# Patient Record
Sex: FEMALE | Race: WHITE
[De-identification: names, ages, dates, MRNs, and addresses within clinical notes are randomized per-mention and may not be internally consistent; named-entity substitution may affect disease eponyms.]

---

## 2017-05-19 ENCOUNTER — HOSPITAL ENCOUNTER (EMERGENCY)
Dept: HOSPITAL 1 - ED | Age: 57
LOS: 1 days | Discharge: HOME | End: 2017-05-20
Payer: MEDICAID

## 2017-05-19 VITALS — HEIGHT: 64 IN | WEIGHT: 173.99 LBS | BODY MASS INDEX: 29.7 KG/M2

## 2017-05-19 DIAGNOSIS — E11.9: ICD-10-CM

## 2017-05-19 DIAGNOSIS — J32.2: ICD-10-CM

## 2017-05-19 DIAGNOSIS — Z88.6: ICD-10-CM

## 2017-05-19 DIAGNOSIS — J32.0: Primary | ICD-10-CM

## 2017-05-19 DIAGNOSIS — J32.3: ICD-10-CM

## 2017-05-19 DIAGNOSIS — Z95.5: ICD-10-CM

## 2017-05-19 DIAGNOSIS — I21.3: ICD-10-CM

## 2017-05-19 DIAGNOSIS — Z79.899: ICD-10-CM

## 2017-05-19 DIAGNOSIS — E78.00: ICD-10-CM

## 2017-05-19 DIAGNOSIS — I10: ICD-10-CM

## 2017-05-19 DIAGNOSIS — Z88.0: ICD-10-CM

## 2017-05-19 DIAGNOSIS — M19.90: ICD-10-CM

## 2017-05-20 VITALS — DIASTOLIC BLOOD PRESSURE: 78 MMHG | SYSTOLIC BLOOD PRESSURE: 136 MMHG

## 2017-05-20 LAB
ALBUMIN SERPL-MCNC: 3.5 G/DL (ref 3.4–5)
ALP SERPL-CCNC: 108 U/L (ref 46–116)
ALT SERPL-CCNC: 79 U/L (ref 14–59)
AST SERPL-CCNC: 47 U/L (ref 15–37)
BASOPHILS NFR BLD: 0.3 % (ref 0–2)
BILIRUB SERPL-MCNC: 0.4 MG/DL (ref 0.2–1)
BUN SERPL-MCNC: 16 MG/DL (ref 7–18)
CALCIUM SERPL-MCNC: 8.7 MG/DL (ref 8.5–10.1)
CHLORIDE SERPL-SCNC: 99 MMOL/L (ref 98–107)
CO2 SERPL-SCNC: 26.8 MMOL/L (ref 21–32)
CREAT SERPL-MCNC: 1 MG/DL (ref 0.6–1)
ERYTHROCYTE [DISTWIDTH] IN BLOOD BY AUTOMATED COUNT: 15.3 % (ref 11.5–14.5)
GFR SERPLBLD BASED ON 1.73 SQ M-ARVRAT: > 60 ML/MIN
GLUCOSE SERPL-MCNC: 237 MG/DL (ref 74–106)
MICROSCOPIC UR-IMP: YES
PLATELET # BLD: 235 X10^3MCL (ref 130–400)
POTASSIUM SERPL-SCNC: 3.8 MMOL/L (ref 3.5–5.1)
PROT SERPL-MCNC: 7.3 G/DL (ref 6.4–8.2)
RBC # UR STRIP.AUTO: NEGATIVE /UL
SODIUM SERPL-SCNC: 133 MMOL/L (ref 136–145)
UA SPECIFIC GRAVITY: 1.01 (ref 1–1.03)

## 2018-08-05 ENCOUNTER — HOSPITAL ENCOUNTER (INPATIENT)
Dept: HOSPITAL 1 - ED | Age: 58
LOS: 1 days | Discharge: HOME | DRG: 243 | End: 2018-08-06
Attending: INTERNAL MEDICINE | Admitting: INTERNAL MEDICINE
Payer: MEDICAID

## 2018-08-05 VITALS — SYSTOLIC BLOOD PRESSURE: 149 MMHG | DIASTOLIC BLOOD PRESSURE: 71 MMHG

## 2018-08-05 VITALS
WEIGHT: 168.25 LBS | BODY MASS INDEX: 29.81 KG/M2 | WEIGHT: 168.25 LBS | HEIGHT: 63 IN | BODY MASS INDEX: 29.81 KG/M2 | HEIGHT: 63 IN

## 2018-08-05 VITALS — DIASTOLIC BLOOD PRESSURE: 71 MMHG | SYSTOLIC BLOOD PRESSURE: 155 MMHG

## 2018-08-05 VITALS — SYSTOLIC BLOOD PRESSURE: 147 MMHG | DIASTOLIC BLOOD PRESSURE: 66 MMHG

## 2018-08-05 VITALS — DIASTOLIC BLOOD PRESSURE: 71 MMHG | SYSTOLIC BLOOD PRESSURE: 158 MMHG

## 2018-08-05 DIAGNOSIS — I11.9: ICD-10-CM

## 2018-08-05 DIAGNOSIS — E78.5: ICD-10-CM

## 2018-08-05 DIAGNOSIS — Z79.84: ICD-10-CM

## 2018-08-05 DIAGNOSIS — Z79.4: ICD-10-CM

## 2018-08-05 DIAGNOSIS — I24.9: ICD-10-CM

## 2018-08-05 DIAGNOSIS — G35: ICD-10-CM

## 2018-08-05 DIAGNOSIS — Z79.82: ICD-10-CM

## 2018-08-05 DIAGNOSIS — M94.0: ICD-10-CM

## 2018-08-05 DIAGNOSIS — K21.9: Primary | ICD-10-CM

## 2018-08-05 DIAGNOSIS — K85.90: ICD-10-CM

## 2018-08-05 DIAGNOSIS — Z87.891: ICD-10-CM

## 2018-08-05 DIAGNOSIS — I25.10: ICD-10-CM

## 2018-08-05 DIAGNOSIS — E11.9: ICD-10-CM

## 2018-08-05 DIAGNOSIS — R74.0: ICD-10-CM

## 2018-08-05 LAB
ALBUMIN SERPL-MCNC: 3.1 G/DL (ref 3.4–5)
ALP SERPL-CCNC: 98 U/L (ref 46–116)
ALT SERPL-CCNC: 109 U/L (ref 14–59)
AMPHETAMINES UR QL SCN: (no result)
AST SERPL-CCNC: 80 U/L (ref 15–37)
BASOPHILS NFR BLD: 0.5 % (ref 0–2)
BILIRUB SERPL-MCNC: 0.18 MG/DL (ref 0.2–1)
BUN SERPL-MCNC: 17 MG/DL (ref 7–18)
CALCIUM SERPL-MCNC: 8 MG/DL (ref 8.5–10.1)
CHLORIDE SERPL-SCNC: 104 MMOL/L (ref 98–107)
CHOLEST SERPL-MCNC: 195 MG/DL (ref ?–200)
CHOLEST/HDLC SERPL: 4.4 MG/DL
CO2 SERPL-SCNC: 25.3 MMOL/L (ref 21–32)
CREAT SERPL-MCNC: 1.1 MG/DL (ref 0.6–1)
ERYTHROCYTE [DISTWIDTH] IN BLOOD BY AUTOMATED COUNT: 17.3 % (ref 11.5–14.5)
GFR SERPLBLD BASED ON 1.73 SQ M-ARVRAT: 54 ML/MIN
GLUCOSE SERPL-MCNC: 186 MG/DL (ref 74–106)
HDLC SERPL-MCNC: 44 MG/DL (ref 40–60)
LIPASE SERPL-CCNC: 423 IU/L (ref 73–393)
MAGNESIUM SERPL-MCNC: 2.1 MG/DL (ref 1.8–2.4)
MICROSCOPIC UR-IMP: YES
PLATELET # BLD: 260 X10^3MCL (ref 130–400)
POTASSIUM SERPL-SCNC: 4.1 MMOL/L (ref 3.5–5.1)
PROT SERPL-MCNC: 6.7 G/DL (ref 6.4–8.2)
RBC # UR STRIP.AUTO: NEGATIVE /UL
SODIUM SERPL-SCNC: 139 MMOL/L (ref 136–145)
TRIGL SERPL-MCNC: 227 MG/DL (ref ?–150)
UA SPECIFIC GRAVITY: 1.01 (ref 1–1.03)

## 2018-08-05 PROCEDURE — A9500 TC99M SESTAMIBI: HCPCS

## 2018-08-06 VITALS — DIASTOLIC BLOOD PRESSURE: 57 MMHG | SYSTOLIC BLOOD PRESSURE: 130 MMHG

## 2018-08-06 VITALS — SYSTOLIC BLOOD PRESSURE: 139 MMHG | DIASTOLIC BLOOD PRESSURE: 65 MMHG

## 2018-08-06 VITALS — SYSTOLIC BLOOD PRESSURE: 145 MMHG | DIASTOLIC BLOOD PRESSURE: 70 MMHG

## 2018-08-06 LAB
BASOPHILS NFR BLD: 0.5 % (ref 0–2)
BUN SERPL-MCNC: 18 MG/DL (ref 7–18)
CALCIUM SERPL-MCNC: 8.7 MG/DL (ref 8.5–10.1)
CHLORIDE SERPL-SCNC: 103 MMOL/L (ref 98–107)
CO2 SERPL-SCNC: 25.1 MMOL/L (ref 21–32)
CREAT SERPL-MCNC: 0.9 MG/DL (ref 0.6–1)
ERYTHROCYTE [DISTWIDTH] IN BLOOD BY AUTOMATED COUNT: 18.9 % (ref 11.5–14.5)
GFR SERPLBLD BASED ON 1.73 SQ M-ARVRAT: > 60 ML/MIN
GLUCOSE SERPL-MCNC: 79 MG/DL (ref 74–106)
LIPASE SERPL-CCNC: 320 IU/L (ref 73–393)
PLATELET # BLD: 252 X10^3MCL (ref 130–400)
POTASSIUM SERPL-SCNC: 3.9 MMOL/L (ref 3.5–5.1)
RBC MORPH BLD: (no result)
SODIUM SERPL-SCNC: 138 MMOL/L (ref 136–145)

## 2019-01-04 ENCOUNTER — HOSPITAL ENCOUNTER (INPATIENT)
Dept: HOSPITAL 1 - ED | Age: 59
LOS: 1 days | Discharge: ANOTHER HOSPITAL | DRG: 190 | End: 2019-01-05
Attending: INTERNAL MEDICINE | Admitting: INTERNAL MEDICINE
Payer: MEDICAID

## 2019-01-04 VITALS
WEIGHT: 158 LBS | BODY MASS INDEX: 29.08 KG/M2 | HEIGHT: 62 IN | HEIGHT: 62 IN | BODY MASS INDEX: 29.08 KG/M2 | WEIGHT: 158 LBS

## 2019-01-04 VITALS — SYSTOLIC BLOOD PRESSURE: 159 MMHG | DIASTOLIC BLOOD PRESSURE: 77 MMHG

## 2019-01-04 DIAGNOSIS — Z79.4: ICD-10-CM

## 2019-01-04 DIAGNOSIS — F41.9: ICD-10-CM

## 2019-01-04 DIAGNOSIS — Z79.82: ICD-10-CM

## 2019-01-04 DIAGNOSIS — F17.210: ICD-10-CM

## 2019-01-04 DIAGNOSIS — I21.9: Primary | ICD-10-CM

## 2019-01-04 DIAGNOSIS — I25.110: ICD-10-CM

## 2019-01-04 DIAGNOSIS — K85.90: ICD-10-CM

## 2019-01-04 DIAGNOSIS — Z95.5: ICD-10-CM

## 2019-01-04 DIAGNOSIS — J44.9: ICD-10-CM

## 2019-01-04 DIAGNOSIS — Z79.02: ICD-10-CM

## 2019-01-04 DIAGNOSIS — E11.65: ICD-10-CM

## 2019-01-04 LAB
ALBUMIN SERPL-MCNC: 3.3 G/DL (ref 3.4–5)
ALP SERPL-CCNC: 139 U/L (ref 46–116)
ALT SERPL-CCNC: 105 U/L (ref 14–59)
AMYLASE SERPL-CCNC: 50 U/L (ref 25–115)
AST SERPL-CCNC: 69 U/L (ref 15–37)
BASOPHILS NFR BLD: 0.7 % (ref 0–2)
BILIRUB SERPL-MCNC: 0.34 MG/DL (ref 0.2–1)
BUN SERPL-MCNC: 12 MG/DL (ref 7–18)
CALCIUM SERPL-MCNC: 8.7 MG/DL (ref 8.5–10.1)
CHLORIDE SERPL-SCNC: 89 MMOL/L (ref 98–107)
CHOLEST SERPL-MCNC: 217 MG/DL (ref ?–200)
CHOLEST/HDLC SERPL: 7 MG/DL
CO2 SERPL-SCNC: 22.2 MMOL/L (ref 21–32)
CREAT SERPL-MCNC: 1.4 MG/DL (ref 0.6–1)
ERYTHROCYTE [DISTWIDTH] IN BLOOD BY AUTOMATED COUNT: 17.1 % (ref 11.5–14.5)
GFR SERPLBLD BASED ON 1.73 SQ M-ARVRAT: 41 ML/MIN
GLUCOSE SERPL-MCNC: 692 MG/DL (ref 74–106)
HDLC SERPL-MCNC: 31 MG/DL (ref 40–60)
LIPASE SERPL-CCNC: 671 IU/L (ref 73–393)
MAGNESIUM SERPL-MCNC: 2.1 MG/DL (ref 1.8–2.4)
PLATELET # BLD: 203 X10^3MCL (ref 130–400)
POTASSIUM SERPL-SCNC: 4.2 MMOL/L (ref 3.5–5.1)
PROT SERPL-MCNC: 7.8 G/DL (ref 6.4–8.2)
SODIUM SERPL-SCNC: 124 MMOL/L (ref 136–145)
TRIGL SERPL-MCNC: 476 MG/DL (ref ?–150)

## 2019-01-04 NOTE — NUR
SPOKE WITH DR. SALEH REGARDING ORDERS RECEIVED FOR HEPARIN DRIP. ORDER FOR STAT
CT HEAD FIRST BEFORE STARTING HEPARIN DRIP. WILL CARRY OUT ORDERS

## 2019-01-04 NOTE — NUR
SPOKE WITH HOMERO FROM CT, STATES HE WILL BE UP IN A FEW MINUTES TO TAKE PT
DOWN FOR ORDERED CT HEAD. PT S/L AT THIS TIME AND AWARE OF CT. NO ACUTE
DISTRESS NOTED. WILL CONTINUE TO MONITOR

## 2019-01-04 NOTE — NUR
RECEIVED PT FROM ED VIA [facility]. NO ACUTE DISTRESS NOTED. PT ABLE TO AMBULATE
FROM ERSandy TO BED WITHOUT ISSUE, GAIT STEADY. SINUS TACH TO TELE #[id number], HR
103, PT C/O PRESSURE LIKE CP RADIATING TO LUE. WILL MEDICATE PRN, CHEST WALL
STABLE. AA/OX4, ABLE TO MAKE NEEDS KNOWN, SPEECH CLEAR AND APPROPRIATE, NO
FACIAL DROOP NOTED, PT C/O HA, PERRLA. PULSES PRESENT AND EQUAL THROUGHOUT, NO
EDEMA NOTED. BREATHING ON RA, EVEN AND UNLABORED, DENIES SOB OR DYSPNEA, O2
SAT 97% ABD ROUND AND SOFT WITH ACTIVE BOWEL SOUNDS, NON TENDER, DENIES
N/V/D. FREELY VOIDS URINE WITHOUT ISSUE. AMBULATORY WITHOUT ASSISTIVE DEVICE,
ABLE TO TURN AND REPOSITION SELF IN BED. SKIN CDI. IV SITE TO LAC IN PLACE,
20G, DRY, PATENT, INTACT, NO PAIN, REDNESS, OR SWELLING WHEN FLUSHED WITH NS.
COMFORT AND SAFETY MEASURES IMPLEMENTED. ORIENTED PT TO ROOM AND CALL LIGHT.
ALL NEEDS ASSESSED AND ATTENDED TO. CALL LIGHT WITHIN REACH. WILL CONTINUE TO
MONITOR

## 2019-01-04 NOTE — NUR
PT PRESENTS TO ED WITH C/O CHEST PAIN AND HEAD PAIN X3 DAYS. PT STS SHE HAS HAD
CP ON AND OFF SINCE TUESDAY AND TONIGHT IT HAS GOTTEN WORSE. PT ALSO STS SHE
HAS HEAD PAIN X3 DAYS. PT DENIES DIZZINESS OR SOB. PT STS SHE ALSO HAS HAD A
DRY COUGH AND THAT PROVOKES THE CHEST PAIN. PT ON CM, SITTING ON GURNEY, RESP
E/U, NO ACUTE DISTRESS NOTED, DAUGHTER AT BEDSIDE.

## 2019-01-04 NOTE — NUR
PT GIVEN 2 DOSES OF NITRO SUBLINGUAL FOR 9/10 CP RADIATING TO LUE. PT ADMITS
TO REDUCTION IN CP AND STATES, "I'M FEELING GOOD." NO ACUTE DISTRESS NOTED AT
THIS TIME. CALL LIGHT WITHIN REACH. WILL CONTINUE TO MONITOR

## 2019-01-05 VITALS — SYSTOLIC BLOOD PRESSURE: 148 MMHG | DIASTOLIC BLOOD PRESSURE: 77 MMHG

## 2019-01-05 VITALS — SYSTOLIC BLOOD PRESSURE: 113 MMHG | DIASTOLIC BLOOD PRESSURE: 59 MMHG

## 2019-01-05 LAB
AMPHETAMINES UR QL SCN: (no result)
BASOPHILS NFR BLD: 1.1 % (ref 0–2)
BUN SERPL-MCNC: 12 MG/DL (ref 7–18)
CALCIUM SERPL-MCNC: 8.5 MG/DL (ref 8.5–10.1)
CHLORIDE SERPL-SCNC: 94 MMOL/L (ref 98–107)
CO2 SERPL-SCNC: 24.8 MMOL/L (ref 21–32)
CREAT SERPL-MCNC: 1.2 MG/DL (ref 0.6–1)
ERYTHROCYTE [DISTWIDTH] IN BLOOD BY AUTOMATED COUNT: 17.2 % (ref 11.5–14.5)
GFR SERPLBLD BASED ON 1.73 SQ M-ARVRAT: 49 ML/MIN
GLUCOSE SERPL-MCNC: 394 MG/DL (ref 74–106)
MICROSCOPIC UR-IMP: YES
PLATELET # BLD: 175 X10^3MCL (ref 130–400)
POTASSIUM SERPL-SCNC: 3.6 MMOL/L (ref 3.5–5.1)
RBC # UR STRIP.AUTO: (no result) /UL
SODIUM SERPL-SCNC: 126 MMOL/L (ref 136–145)
UA SPECIFIC GRAVITY: 1.01 (ref 1–1.03)

## 2019-01-05 NOTE — NUR
PT C/O 9/10 HEADACHE AT THIS TIME AND REQUESTING PAIN MED. DENIES CP.
MEDICATED WITH PRN NORCO PER EMAR

## 2019-01-05 NOTE — NUR
DR. ELIZONDO TALKED TO PT. PT STATED CHEST PAIN 7/10, NITRO SL GIVEN PER PRN
ORDER. WILL GET PT READY FOR TRANSFERING TO ICU.

## 2019-01-05 NOTE — NUR
HEPARIN DRIP INFUSING  UNITS/ML AT THIS TIME. ANTICIPATING 0800 PTT. NO
SIGNIFICANT CHANGES TO REPORT, PT COMPLIED WITH NURSING CARE THROUGHOUT THE
SHIFT WITH NO ACUTE EVENTS OVERNIGHT. NO ACUTE DISTRESS NOTED AT THIS TIME, PT
LAYING IN BED, BREATHING EVEN AND UNLABORED. COMFORT AND SAFETY MEASURES
MAINTAINED, ALL NEEDS ASSESSED AND ATTENDED TO. CALL LIGHT WITHIN REACH. WILL
ENDORSE CARE TO DAY SHIFT NURSE 98

## 2019-01-05 NOTE — NUR
RECEIVED CALL FROM ENRRIQUE AT [facility]. PATIENT TO BE TRANSFERRED TO ROOM 230A.
ACCEPTING: DR BAEZ, CARDIOLOGY: DR PINTO. REPORT TO BE CALLED TO
[phone]. WILL ENDORSE INFORMATION TO PRIMARY BING PRESSLEY.

## 2019-01-05 NOTE — NUR
REPORT CALLED AND GIVEN TO JASON FROM [facility]. ALL QUESTIONS AND CONCERNS
ADDRESSED AT THIS TIME. CALL BACK NUMBER LEFT IF FURTHER QUESTIONS WERE TO
ARISE

## 2019-01-05 NOTE — NUR
PATIENT'S DAUGHTER JUAN AND [facility] AT BEDSIDE TO TRANSFER PATIENT TO [facility].
ALL OF PATIENT'S BELONGINGS GIVEN TO DAUGHTER JUAN MAGO.

## 2019-01-05 NOTE — NUR
RECEIVED CALL FROM ENRRIQUE (BED CONTROL) AT [facility]. PATIENT
UPDATE PROVIDED AND H&P FAXED TO [phone]. AWAITING BED ASSIGNMENT AS
PATIENT HAS BEEN ACCEPTED BY CARDIOLOGY AND ACCEPTING MD.

## 2019-01-05 NOTE — NUR
PT SEEN REST ON BED, A/O X 3. PT DENIED CHEST PAIN, BUT STATED WEAKNESS. AND
NAUSEA IS SOME RESOVLED. PT BREATHING ON RA, EVEN, UNLABORED. IV SITE PATENT,
INTACT. HEPARIN INFUSING  UNITS/HR, NEXT PTT DRAWN SCHEDULED AT 0800 AM.
WILL CONTINUE TO MONITOR.

## 2019-01-05 NOTE — NUR
HEPARIN DRIP STARTED PER DR'S ORDERS. IV SITE TO LAC 20G IN PLACE, PATENT, AND
INTACT, NO PAIN, REDNESS, OR SWELLING NOTED. LOADING DOSE OF 4,300 UNITS IVP
AND INITIAL RATE STARTED  UNITS/HR, VERIFIED WITH BING [place]. STAT PTT
ORDERED FOR LATER TODAY AT 0800 PER PROTOCOL. PT EDUCATED FOR NEED OF HEPARIN
DRIP AND SAFETY PRECAUTIONS WHILE ON DRIP, PT VERBALIZED UNDERSTANDING. NO
ACUTE DISTRESS NOTED AT THIS TIME. PT LAYING IN BED WITH SON AT BEDSIDE. CALL
LIGHT WITHIN REACH. WILL CONTINUE TO MONITOR

## 2019-01-05 NOTE — NUR
TELEPHONED PATIENT'S DAUGHTER JUAN AND INFORMED HER PATIENT TO BE TRANSFERRED
TO [facility] ROOM 230A CICU.

## 2019-01-05 NOTE — NUR
PT VOMIT X 2. SHE VOMIT RIGHT AFTER TAKING ALL MORNING MED INCLUDING ORAL
ZOFRAM. PHANERGAN GIVEN PER PRN ORDER.
GOT PT'S PTT RESULT 42.2, REBOLUS 2900 UNITS, INCREASED HEPARIN DRIP FROM 900
TO 1000 UNITS PER HEPARIN PROTOCOL. NEXT PTT DRAWN ORDERED AT 1430 PM. WILL
CONTINUE TO MONITOR.

## 2019-01-05 NOTE — NUR
PT TRANSFERRED TO ICU BED 8. RC'D REPORT FROM ESTELA. PT CURRENTLY RESTING IN
BED. A/A/O/X4, SPEECH CLEAR AND APPROPRIATE. PT REPORTS MILD HA 4/10, PT RC'D
MEDICATION PRIOR TO ARRIVAL PER EMAR. PT C/O OF INTERMITTENT ACHING
CHEST PAIN 6/10. RESPIRATIONS EQUAL AND UNLABORED. ON RA, DENIES SOB. ABDOMEN
SOFT AND NONTENDER. DENIES N/V. PT AMBULATORY WITH BRP. SKIN W/D/I. IV PATENT
AND INTACT, LAC 20G. HEPARIN DRIP CURRENTLY INFUSING AT 1000 UNITS/HR. BED IN
LOW POSITION. WILL CONTINUE TO MONITOR